# Patient Record
Sex: MALE | Race: WHITE | NOT HISPANIC OR LATINO | Employment: OTHER | URBAN - METROPOLITAN AREA
[De-identification: names, ages, dates, MRNs, and addresses within clinical notes are randomized per-mention and may not be internally consistent; named-entity substitution may affect disease eponyms.]

---

## 2024-08-30 NOTE — PROGRESS NOTES
Subjective:     Encounter Date:09/04/2024    Primary Care Physician: Ezekiel Joel MD      Patient ID: Alexis Hou is a 64 y.o. male.    Chief Complaint:Shortness of Breath    PROBLEM LIST:  Dyspnea   Hypertension  Dyslipidemia  Borderline diabetes  Sleep apnea on CPAP  Anxiety  BPH  Skin cancer with removal  GERD  Arthritis  Stills disease  Surgeries:  Umbilical hernia repair  Right ACL surgery x 3        Allergies   Allergen Reactions    Penicillins Hives         Current Outpatient Medications:     FLUoxetine (PROzac) 20 MG capsule, Take 1 capsule by mouth Daily., Disp: , Rfl:     ibuprofen (ADVIL,MOTRIN) 800 MG tablet, Take 1 tablet by mouth As Needed., Disp: , Rfl:     lisinopril (PRINIVIL,ZESTRIL) 20 MG tablet, Take 1 tablet by mouth Daily., Disp: , Rfl:     omeprazole (priLOSEC) 40 MG capsule, Take 1 capsule by mouth Daily., Disp: , Rfl:     rosuvastatin (CRESTOR) 20 MG tablet, Take 1 tablet by mouth Daily., Disp: , Rfl:         History of Present Illness    Patient is a 64-year-old  male who presents today for further evaluation of dyspnea.  Patient has not had a previous ischemic cardiac evaluation.  He notes that about 8 years ago he had an exacerbation of stills disease.  At that time he had a MARIAM but does not remember the exact findings.  Since that time had been doing overall relatively well.  Has had a couple of episodes whenever he has been out in the heat and has become hypotensive.  Notes that his primary care physician had adjusted some of his medications as well.  Has had some intermittent episodes of dyspnea.  No significant chest pain, pressure.  Dyspnea does not seem to be consistent.  No reported syncope, presyncope, or edema.  Given his cardiac risk factors he was referred here for further evaluation.  Patient also has recently started semaglutide.    The following portions of the patient's history were reviewed and updated as appropriate: allergies, current medications, past  "family history, past medical history, past social history, past surgical history and problem list.    Family History   Problem Relation Age of Onset    Hypertension Father        Social History     Tobacco Use    Smoking status: Former     Current packs/day: 1.00     Average packs/day: 1 pack/day for 15.0 years (15.0 ttl pk-yrs)     Types: Cigarettes   Substance Use Topics    Alcohol use: Yes     Alcohol/week: 8.0 standard drinks of alcohol     Types: 8 Cans of beer per week    Drug use: Never         Review of Systems   Constitutional: Positive for malaise/fatigue. Negative for fever.   HENT:  Negative for nosebleeds.    Eyes:  Negative for redness and visual disturbance.   Cardiovascular:  Positive for dyspnea on exertion. Negative for orthopnea, palpitations and paroxysmal nocturnal dyspnea.   Respiratory:  Positive for snoring. Negative for cough, sputum production and wheezing.    Hematologic/Lymphatic: Negative for bleeding problem.   Skin:  Negative for flushing, itching and rash.   Musculoskeletal:  Positive for arthritis. Negative for falls, joint pain and muscle cramps.   Gastrointestinal:  Positive for heartburn. Negative for abdominal pain, diarrhea, nausea and vomiting.   Genitourinary:  Negative for hematuria.   Neurological:  Negative for excessive daytime sleepiness, dizziness, headaches, tremors and weakness.   Psychiatric/Behavioral:  Negative for substance abuse. The patient is nervous/anxious.           Objective:   /77   Pulse 59   Ht 175.3 cm (69\")   Wt 96.2 kg (212 lb)   SpO2 100%   BMI 31.31 kg/m²         Vitals reviewed.   Constitutional:       Appearance: Healthy appearance. Well-developed and not in distress.   Eyes:      Conjunctiva/sclera: Conjunctivae normal.      Pupils: Pupils are equal, round, and reactive to light.   HENT:      Head: Normocephalic and atraumatic.    Mouth/Throat:      Pharynx: Oropharynx is clear.   Neck:      Thyroid: Thyroid normal. No thyromegaly.      " Vascular: Normal carotid pulses. No carotid bruit or JVD. JVD normal.      Lymphadenopathy: No cervical adenopathy.   Pulmonary:      Effort: No respiratory distress.      Breath sounds: No wheezing. No rales.   Chest:      Chest wall: Not tender to palpatation.   Cardiovascular:      Normal rate. Regular rhythm.      No gallop.    Pulses:     Carotid: 2+ bilaterally with bruit on the left.     Dorsalis pedis: 2+ bilaterally.     Posterior tibial: 2+ bilaterally.  Edema:     Peripheral edema absent.   Abdominal:      General: There is no distension or abdominal bruit.      Palpations: There is no abdominal mass.      Tenderness: There is no abdominal tenderness. There is no rebound.   Musculoskeletal:         General: No tenderness or deformity.      Extremities: No clubbing present.Skin:     General: Skin is warm and dry. There is no cyanosis.      Findings: No rash.   Neurological:      Mental Status: Alert, oriented to person, place, and time and oriented to person, place and time.           ECG 12 Lead    Date/Time: 9/4/2024 1:23 PM  Performed by: Michael Tellez MD    Authorized by: Michael Tellez MD  Comparison: not compared with previous ECG   Previous ECG: no previous ECG available  Rhythm: sinus bradycardia  Ectopy: atrial premature contractions                Assessment:   Assessment & Plan      Diagnoses and all orders for this visit:    1. Dyspnea on exertion (Primary)  -     ECG 12 Lead      1.  New onset dyspnea on exertion, possible anginal event  2.  Atypical chest pain,  3.  Hypertension  4.  Dyslipidemia on statin last LDL cholesterol 90  5.  Borderline/prediabetes hemoglobin A1c of 6.2  6.  Sleep apnea on CPAP  7.  Episode of presyncope, appears to be due to dehydration.  Has not recurred now that he is off his HCTZ.     Recommendations:  1.  Check echocardiogram for new onset dyspnea  2.  Exercise stress myocardial perfusion study  3.  Carotid duplex for new bruit  4.  Further recommendations  after the above    Mirna DEXTER scribed portions of this dictation for Dr. Michael Tellez.    Advance Care Planning   ACP discussion was held with the patient during this visit. Patient does not have an advance directive, declines further assistance.      I have seen and examined the patient, I have reviewed the note, discussed the case with the advance practice clinician, made necessary changes and I agree with the final note.    Michael Tellez MD  09/04/24  15:40 EDT      Dictated utilizing Dragon dictation

## 2024-09-04 ENCOUNTER — OFFICE VISIT (OUTPATIENT)
Dept: CARDIOLOGY | Facility: CLINIC | Age: 64
End: 2024-09-04
Payer: MEDICARE

## 2024-09-04 VITALS
WEIGHT: 212 LBS | DIASTOLIC BLOOD PRESSURE: 77 MMHG | HEART RATE: 59 BPM | HEIGHT: 69 IN | BODY MASS INDEX: 31.4 KG/M2 | OXYGEN SATURATION: 100 % | SYSTOLIC BLOOD PRESSURE: 149 MMHG

## 2024-09-04 DIAGNOSIS — R55 SYNCOPE, UNSPECIFIED SYNCOPE TYPE: ICD-10-CM

## 2024-09-04 DIAGNOSIS — R07.9 CHEST PAIN, UNSPECIFIED TYPE: ICD-10-CM

## 2024-09-04 DIAGNOSIS — R06.09 DYSPNEA ON EXERTION: Primary | ICD-10-CM

## 2024-09-04 PROBLEM — Z86.0100 HISTORY OF COLONIC POLYPS: Status: ACTIVE | Noted: 2022-12-01

## 2024-09-04 PROBLEM — Z86.010 HISTORY OF COLONIC POLYPS: Status: ACTIVE | Noted: 2022-12-01

## 2024-09-04 PROBLEM — F41.9 ANXIETY: Status: ACTIVE | Noted: 2022-12-01

## 2024-09-04 PROBLEM — R73.9 HYPERGLYCEMIA: Status: ACTIVE | Noted: 2023-03-01

## 2024-09-04 PROBLEM — M54.50 LOWER BACK PAIN: Status: ACTIVE | Noted: 2022-08-24

## 2024-09-04 PROBLEM — I10 ESSENTIAL HYPERTENSION: Status: ACTIVE | Noted: 2022-12-01

## 2024-09-04 PROBLEM — N40.0 BENIGN PROSTATIC HYPERPLASIA: Status: ACTIVE | Noted: 2022-12-01

## 2024-09-04 PROBLEM — K21.9 GASTROESOPHAGEAL REFLUX DISEASE WITHOUT ESOPHAGITIS: Status: ACTIVE | Noted: 2023-05-31

## 2024-09-04 PROCEDURE — 3078F DIAST BP <80 MM HG: CPT | Performed by: INTERNAL MEDICINE

## 2024-09-04 PROCEDURE — 99204 OFFICE O/P NEW MOD 45 MIN: CPT | Performed by: INTERNAL MEDICINE

## 2024-09-04 PROCEDURE — 1160F RVW MEDS BY RX/DR IN RCRD: CPT | Performed by: INTERNAL MEDICINE

## 2024-09-04 PROCEDURE — 3077F SYST BP >= 140 MM HG: CPT | Performed by: INTERNAL MEDICINE

## 2024-09-04 PROCEDURE — 1159F MED LIST DOCD IN RCRD: CPT | Performed by: INTERNAL MEDICINE

## 2024-09-04 PROCEDURE — 93000 ELECTROCARDIOGRAM COMPLETE: CPT | Performed by: INTERNAL MEDICINE

## 2024-09-04 RX ORDER — IBUPROFEN 800 MG/1
1 TABLET, FILM COATED ORAL AS NEEDED
COMMUNITY

## 2024-09-04 RX ORDER — OMEPRAZOLE 40 MG/1
1 CAPSULE, DELAYED RELEASE ORAL DAILY
COMMUNITY
Start: 2024-04-11

## 2024-09-04 RX ORDER — TADALAFIL 20 MG/1
40 TABLET ORAL
COMMUNITY
End: 2024-09-04

## 2024-09-04 RX ORDER — LISINOPRIL 20 MG/1
20 TABLET ORAL DAILY
COMMUNITY
Start: 2024-04-11

## 2024-09-04 RX ORDER — ROSUVASTATIN CALCIUM 20 MG/1
1 TABLET, COATED ORAL DAILY
COMMUNITY
Start: 2024-04-11

## 2024-09-24 ENCOUNTER — HOSPITAL ENCOUNTER (OUTPATIENT)
Dept: CARDIOLOGY | Facility: HOSPITAL | Age: 64
Discharge: HOME | End: 2024-09-24
Payer: MEDICARE

## 2024-09-24 VITALS
SYSTOLIC BLOOD PRESSURE: 174 MMHG | HEIGHT: 69 IN | DIASTOLIC BLOOD PRESSURE: 74 MMHG | WEIGHT: 212 LBS | BODY MASS INDEX: 31.4 KG/M2

## 2024-09-24 VITALS — HEART RATE: 54 BPM | SYSTOLIC BLOOD PRESSURE: 128 MMHG | DIASTOLIC BLOOD PRESSURE: 88 MMHG

## 2024-09-24 DIAGNOSIS — R07.9 CHEST PAIN, UNSPECIFIED TYPE: ICD-10-CM

## 2024-09-24 DIAGNOSIS — R06.09 DYSPNEA ON EXERTION: ICD-10-CM

## 2024-09-24 LAB
BH CV ECHO MEAS - AO MAX PG: 5.4 MMHG
BH CV ECHO MEAS - AO MEAN PG: 4 MMHG
BH CV ECHO MEAS - AO ROOT DIAM: 3.5 CM
BH CV ECHO MEAS - AO V2 MAX: 115.7 CM/SEC
BH CV ECHO MEAS - AVA(I,D): 2.1 CM2
BH CV ECHO MEAS - EF(MOD-BP): 57.7 %
BH CV ECHO MEAS - EF(MOD-SP2): 62.1 %
BH CV ECHO MEAS - EF(MOD-SP4): 52.8 %
BH CV ECHO MEAS - IVS/LVPW: 1 CM
BH CV ECHO MEAS - IVSD: 0.9 CM
BH CV ECHO MEAS - LA DIMENSION: 3.1 CM
BH CV ECHO MEAS - LAT PEAK E' VEL: 15.1 CM/SEC
BH CV ECHO MEAS - LV MAX PG: 4.4 MMHG
BH CV ECHO MEAS - LV MEAN PG: 2.23 MMHG
BH CV ECHO MEAS - LV V1 MAX: 104.1 CM/SEC
BH CV ECHO MEAS - LV V1 VTI: 24.1 CM
BH CV ECHO MEAS - LVIDD: 4.2 CM
BH CV ECHO MEAS - LVIDS: 2.8 CM
BH CV ECHO MEAS - LVOT DIAM: 2 CM
BH CV ECHO MEAS - LVPWD: 0.9 CM
BH CV ECHO MEAS - MED PEAK E' VEL: 10.4 CM/SEC
BH CV ECHO MEAS - MV A MAX VEL: 52.5 CM/SEC
BH CV ECHO MEAS - MV E MAX VEL: 58.6 CM/SEC
BH CV ECHO MEAS - MV E/A: 1.12
BH CV ECHO MEAS - MV MAX PG: 2.43 MMHG
BH CV ECHO MEAS - MV MEAN PG: 1 MMHG
BH CV ECHO MEAS - TAPSE (>1.6): 2.06 CM
BH CV ECHO MEASUREMENTS AVERAGE E/E' RATIO: 4.6
BH CV REST NUCLEAR ISOTOPE DOSE: 9.9 MCI
BH CV STRESS BP STAGE 1: NORMAL
BH CV STRESS BP STAGE 2: NORMAL
BH CV STRESS DURATION MIN STAGE 1: 3
BH CV STRESS DURATION MIN STAGE 2: 3
BH CV STRESS DURATION MIN STAGE 3: 1
BH CV STRESS DURATION SEC STAGE 1: 0
BH CV STRESS DURATION SEC STAGE 2: 0
BH CV STRESS DURATION SEC STAGE 3: 22
BH CV STRESS GRADE STAGE 1: 10
BH CV STRESS GRADE STAGE 2: 12
BH CV STRESS GRADE STAGE 3: 14
BH CV STRESS HR STAGE 1: 94
BH CV STRESS HR STAGE 2: 130
BH CV STRESS HR STAGE 3: 134
BH CV STRESS METS STAGE 1: 5
BH CV STRESS METS STAGE 2: 7.5
BH CV STRESS METS STAGE 3: 10
BH CV STRESS NUCLEAR ISOTOPE DOSE: 32 MCI
BH CV STRESS O2 STAGE 1: 99
BH CV STRESS O2 STAGE 2: 97
BH CV STRESS O2 STAGE 3: 95
BH CV STRESS PROTOCOL 1: NORMAL
BH CV STRESS RECOVERY BP: NORMAL MMHG
BH CV STRESS RECOVERY HR: 74 BPM
BH CV STRESS RECOVERY O2: 98 %
BH CV STRESS SPEED STAGE 1: 1.7
BH CV STRESS SPEED STAGE 2: 2.5
BH CV STRESS SPEED STAGE 3: 3.4
BH CV STRESS STAGE 1: 1
BH CV STRESS STAGE 2: 2
BH CV STRESS STAGE 3: 3
BH CV VAS BP LEFT ARM: NORMAL MMHG
BH CV XLRA - RV BASE: 3.9 CM
BH CV XLRA - RV LENGTH: 7.4 CM
BH CV XLRA - TDI S': 11 CM/SEC
LV EF 2D ECHO EST: 60 %
LV EF NUC BP: 68 %
MAXIMAL PREDICTED HEART RATE: 154 BPM
PERCENT MAX PREDICTED HR: 87.01 %
STRESS BASELINE BP: NORMAL MMHG
STRESS BASELINE HR: 54 BPM
STRESS O2 SAT REST: 97 %
STRESS PERCENT HR: 102 %
STRESS POST ESTIMATED WORKLOAD: 8 METS
STRESS POST EXERCISE DUR MIN: 7 MIN
STRESS POST EXERCISE DUR SEC: 22 SEC
STRESS POST O2 SAT PEAK: 99 %
STRESS POST PEAK BP: NORMAL MMHG
STRESS POST PEAK HR: 134 BPM
STRESS TARGET HR: 131 BPM

## 2024-09-24 PROCEDURE — 93306 TTE W/DOPPLER COMPLETE: CPT

## 2024-09-24 PROCEDURE — 78452 HT MUSCLE IMAGE SPECT MULT: CPT

## 2024-09-24 PROCEDURE — 93017 CV STRESS TEST TRACING ONLY: CPT

## 2024-09-24 PROCEDURE — 93016 CV STRESS TEST SUPVJ ONLY: CPT | Performed by: INTERNAL MEDICINE

## 2024-09-24 PROCEDURE — 93018 CV STRESS TEST I&R ONLY: CPT | Performed by: INTERNAL MEDICINE

## 2024-09-24 PROCEDURE — 78452 HT MUSCLE IMAGE SPECT MULT: CPT | Performed by: INTERNAL MEDICINE

## 2024-09-24 PROCEDURE — A9500 TC99M SESTAMIBI: HCPCS | Performed by: INTERNAL MEDICINE

## 2024-09-24 PROCEDURE — 93306 TTE W/DOPPLER COMPLETE: CPT | Performed by: INTERNAL MEDICINE

## 2024-09-24 PROCEDURE — 0 TECHNETIUM SESTAMIBI: Performed by: INTERNAL MEDICINE

## 2024-09-24 RX ADMIN — TECHNETIUM TC 99M SESTAMIBI 1 DOSE: 1 INJECTION INTRAVENOUS at 09:35

## 2024-09-24 RX ADMIN — TECHNETIUM TC 99M SESTAMIBI 1 DOSE: 1 INJECTION INTRAVENOUS at 12:53

## 2024-09-30 ENCOUNTER — HOSPITAL ENCOUNTER (OUTPATIENT)
Facility: HOSPITAL | Age: 64
Discharge: HOME | End: 2024-09-30
Admitting: INTERNAL MEDICINE
Payer: MEDICARE

## 2024-09-30 VITALS — HEIGHT: 69 IN | WEIGHT: 212 LBS | BODY MASS INDEX: 31.4 KG/M2

## 2024-09-30 DIAGNOSIS — R06.09 DYSPNEA ON EXERTION: ICD-10-CM

## 2024-09-30 DIAGNOSIS — R55 SYNCOPE, UNSPECIFIED SYNCOPE TYPE: ICD-10-CM

## 2024-09-30 DIAGNOSIS — R07.9 CHEST PAIN, UNSPECIFIED TYPE: ICD-10-CM

## 2024-09-30 LAB
BH CV XLRA MEAS LEFT DIST CCA EDV: 16 CM/SEC
BH CV XLRA MEAS LEFT DIST CCA PSV: 73 CM/SEC
BH CV XLRA MEAS LEFT DIST ICA EDV: 29 CM/SEC
BH CV XLRA MEAS LEFT DIST ICA PSV: 83 CM/SEC
BH CV XLRA MEAS LEFT ICA/CCA RATIO: 1.73
BH CV XLRA MEAS LEFT MID CCA EDV: 18.8 CM/SEC
BH CV XLRA MEAS LEFT MID CCA PSV: 86.2 CM/SEC
BH CV XLRA MEAS LEFT MID ICA EDV: 35 CM/SEC
BH CV XLRA MEAS LEFT MID ICA PSV: 110 CM/SEC
BH CV XLRA MEAS LEFT PROX CCA EDV: 18 CM/SEC
BH CV XLRA MEAS LEFT PROX CCA PSV: 87 CM/SEC
BH CV XLRA MEAS LEFT PROX ECA EDV: 20 CM/SEC
BH CV XLRA MEAS LEFT PROX ECA PSV: 134 CM/SEC
BH CV XLRA MEAS LEFT PROX ICA EDV: 44 CM/SEC
BH CV XLRA MEAS LEFT PROX ICA PSV: 149 CM/SEC
BH CV XLRA MEAS LEFT PROX SCLA PSV: 179 CM/SEC
BH CV XLRA MEAS LEFT VERTEBRAL A EDV: 20 CM/SEC
BH CV XLRA MEAS LEFT VERTEBRAL A PSV: 60 CM/SEC
BH CV XLRA MEAS RIGHT DIST CCA EDV: 16.8 CM/SEC
BH CV XLRA MEAS RIGHT DIST CCA PSV: 66 CM/SEC
BH CV XLRA MEAS RIGHT DIST ICA EDV: 39 CM/SEC
BH CV XLRA MEAS RIGHT DIST ICA PSV: 128 CM/SEC
BH CV XLRA MEAS RIGHT ICA/CCA RATIO: 1.44
BH CV XLRA MEAS RIGHT MID CCA EDV: 17 CM/SEC
BH CV XLRA MEAS RIGHT MID CCA PSV: 90 CM/SEC
BH CV XLRA MEAS RIGHT MID ICA EDV: 32.3 CM/SEC
BH CV XLRA MEAS RIGHT MID ICA PSV: 130 CM/SEC
BH CV XLRA MEAS RIGHT PROX CCA EDV: 19 CM/SEC
BH CV XLRA MEAS RIGHT PROX CCA PSV: 118 CM/SEC
BH CV XLRA MEAS RIGHT PROX ECA EDV: 22.5 CM/SEC
BH CV XLRA MEAS RIGHT PROX ECA PSV: 134 CM/SEC
BH CV XLRA MEAS RIGHT PROX ICA EDV: 33.5 CM/SEC
BH CV XLRA MEAS RIGHT PROX ICA PSV: 80 CM/SEC
BH CV XLRA MEAS RIGHT PROX SCLA PSV: 166 CM/SEC
BH CV XLRA MEAS RIGHT VERTEBRAL A EDV: 5.1 CM/SEC
BH CV XLRA MEAS RIGHT VERTEBRAL A PSV: 54 CM/SEC
LEFT ARM BP: NORMAL MMHG
RIGHT ARM BP: NORMAL MMHG

## 2024-09-30 PROCEDURE — 93880 EXTRACRANIAL BILAT STUDY: CPT

## 2024-09-30 PROCEDURE — 93880 EXTRACRANIAL BILAT STUDY: CPT | Performed by: INTERNAL MEDICINE
